# Patient Record
Sex: MALE | Race: WHITE
[De-identification: names, ages, dates, MRNs, and addresses within clinical notes are randomized per-mention and may not be internally consistent; named-entity substitution may affect disease eponyms.]

---

## 2021-12-04 ENCOUNTER — HOSPITAL ENCOUNTER (OUTPATIENT)
Dept: HOSPITAL 33 - ED | Age: 26
Setting detail: OBSERVATION
LOS: 1 days | Discharge: HOME | End: 2021-12-05
Attending: INTERNAL MEDICINE | Admitting: INTERNAL MEDICINE
Payer: SELF-PAY

## 2021-12-04 DIAGNOSIS — F10.929: Primary | ICD-10-CM

## 2021-12-04 DIAGNOSIS — Z20.828: ICD-10-CM

## 2021-12-04 DIAGNOSIS — R53.1: ICD-10-CM

## 2021-12-04 DIAGNOSIS — F17.200: ICD-10-CM

## 2021-12-04 DIAGNOSIS — S00.83XA: ICD-10-CM

## 2021-12-04 LAB
ALBUMIN SERPL-MCNC: 4.6 G/DL (ref 3.5–5)
ALP SERPL-CCNC: 65 U/L (ref 38–126)
ALT SERPL-CCNC: 14 U/L (ref 0–50)
ANION GAP SERPL CALC-SCNC: 18.5 MEQ/L (ref 5–15)
APAP SPEC-MCNC: < 10 UG/ML (ref 10–30)
AST SERPL QL: 24 U/L (ref 17–59)
BASOPHILS # BLD AUTO: 0.04 10*3/UL (ref 0–0.4)
BASOPHILS NFR BLD AUTO: 0.6 % (ref 0–0.4)
BILIRUB BLD-MCNC: 0.3 MG/DL (ref 0.2–1.3)
BUN SERPL-MCNC: 16 MG/DL (ref 9–20)
CALCIUM SPEC-MCNC: 8.5 MG/DL (ref 8.4–10.2)
CHLORIDE SERPL-SCNC: 106 MMOL/L (ref 98–107)
CO2 SERPL-SCNC: 21 MMOL/L (ref 22–30)
CREAT SERPL-MCNC: 0.98 MG/DL (ref 0.66–1.25)
EOSINOPHIL # BLD AUTO: 0.07 10*3/UL (ref 0–0.5)
ETHANOL SERPL-MCNC: 295 MG/DL (ref 0–10)
GFR SERPLBLD BASED ON 1.73 SQ M-ARVRAT: > 60 ML/MIN
GLUCOSE SERPL-MCNC: 103 MG/DL (ref 74–106)
HCT VFR BLD AUTO: 46.5 % (ref 42–50)
HGB BLD-MCNC: 15.9 GM/DL (ref 12.5–18)
LYMPHOCYTES # SPEC AUTO: 1.31 10*3/UL (ref 1–4.6)
MCH RBC QN AUTO: 31.6 PG (ref 26–32)
MCHC RBC AUTO-ENTMCNC: 34.2 G/DL (ref 32–36)
MONOCYTES # BLD AUTO: 0.41 10*3/UL (ref 0–1.3)
PLATELET # BLD AUTO: 235 K/MM3 (ref 150–450)
POTASSIUM SERPLBLD-SCNC: 4.4 MMOL/L (ref 3.5–5.1)
PROT SERPL-MCNC: 7.4 G/DL (ref 6.3–8.2)
RBC # BLD AUTO: 5.03 M/MM3 (ref 4.1–5.6)
SALICYLATES SERPL-MCNC: < 1 MG/DL (ref 2–20)
SODIUM SERPL-SCNC: 141 MMOL/L (ref 137–145)
WBC # BLD AUTO: 7.2 K/MM3 (ref 4–10.5)

## 2021-12-04 PROCEDURE — 36415 COLL VENOUS BLD VENIPUNCTURE: CPT

## 2021-12-04 PROCEDURE — 72125 CT NECK SPINE W/O DYE: CPT

## 2021-12-04 PROCEDURE — 0241U: CPT

## 2021-12-04 PROCEDURE — 85025 COMPLETE CBC W/AUTO DIFF WBC: CPT

## 2021-12-04 PROCEDURE — 70450 CT HEAD/BRAIN W/O DYE: CPT

## 2021-12-04 PROCEDURE — 99285 EMERGENCY DEPT VISIT HI MDM: CPT

## 2021-12-04 PROCEDURE — 93005 ELECTROCARDIOGRAM TRACING: CPT

## 2021-12-04 PROCEDURE — G0378 HOSPITAL OBSERVATION PER HR: HCPCS

## 2021-12-04 PROCEDURE — 80053 COMPREHEN METABOLIC PANEL: CPT

## 2021-12-04 PROCEDURE — 80307 DRUG TEST PRSMV CHEM ANLYZR: CPT

## 2021-12-04 PROCEDURE — G0480 DRUG TEST DEF 1-7 CLASSES: HCPCS

## 2021-12-04 NOTE — ERPHSYRPT
- History of Present Illness


Time Seen by Provider: 12/04/21 20:36


Source: patient, police


Exam Limitations: intoxication


Patient Subjective Stated Complaint: Alcohol intoxication


Triage Nursing Assessment: Patient escorted into ED via 2 police. Patient alert 

to self. Patient unable to stand alone without assistance. Police state they 

were called to a residence where the patient's parent's live due to a 

disturbance. Patient's parent's report to police that he is homeless and lives 

[place] and they picked him up to shower and get something to eat. 

Patient then became intoxicated and fighting with family. Upon police arrival 

patient unable to stand erect per self.


Physician History: 





26-year-old is brought in the ER by PD who were called by parents as patient was

intoxicated and was fighting with them.  Patient is having difficulty/hard time 

standing and is sleepy, arousable and alert to self.  Has a small abrasion/cut 

on the right forehead.  Patient is placed in ED hold by PD.  No further history 

available.


Allergies/Adverse Reactions: 








No Known Drug Allergies Allergy (Verified 12/04/21 20:35)


   





Home Medications: 








No Reportable Medications [No Reported Medications]  07/07/13 [History]





Hx Tetanus, Diphtheria Vaccination/Date Given: Yes


Hx Influenza Vaccination/Date Given: No


Hx Pneumococcal Vaccination/Date Given: No


Immunizations Up to Date: Yes





Travel Risk





- International Travel


Have you traveled outside of the country in past 3 weeks: No





- Coronavirus Screening


Are you exhibiting any of the following symptoms?: No


Close contact with a COVID-19 positive Pt in past 14-21 Days: No





- Vaccine Status


Have you recieved a Covid-19 vaccination: No


: Unknown





- Vaccination Dates


Dates if Unknown: unknown





- Past Medical History


Pertinent Past Medical History: Yes


GI Medical History: Other


Psycho-Social History: Depression


Other Medical History: Poor historian





- Past Surgical History


Past Surgical History: No


Other Surgical History: Poor historian





- Social History


Smoking Status: Never smoker


Exposure to second hand smoke: No


Drug Use: marijuana


Patient Lives Alone: Yes (homeless)





- Review of Systems


All Other Systems: Unable due to condition





- Nursing Vital Signs


Nursing Vital Signs: 


                               Initial Vital Signs











Temperature  96.8 F   12/04/21 20:35


 


Pulse Rate  87   12/04/21 20:35


 


Respiratory Rate  18   12/04/21 20:35


 


Blood Pressure  119/71   12/04/21 20:35


 


O2 Sat by Pulse Oximetry  95   12/04/21 20:35








                                   Pain Scale











Pain Intensity                 0

















- Physical Exam


General Appearance: no apparent distress, other (Sleepy but arousable)


Eyes, Ears, Nose, Throat Exam: normal ENT inspection, TMs normal, pharynx 

normal, moist mucous membranes


Neck Exam: normal inspection, non-tender, supple, full range of motion


Respiratory Exam: normal breath sounds, lungs clear


Cardiovascular Exam: regular rate/rhythm, normal heart sounds


Gastrointestinal/Abdominal Exam: soft, normal bowel sounds, No tenderness


Extremities Exam: normal inspection


Current Suicidality: denies suicide plan


Neurological Exam: calm, CNs II-XII nml as tested, No normal mood/affect, No 

oriented x 3


Appearance: impaired insight


Behavior/Eye Contact/Speech: intoxicated appearance


Skin Exam: normal color


**SpO2 Interpretation**: normal


SpO2: 97


O2 Delivery: Room Air


Ordered Tests: 


                               Active Orders 24 hr











 Category Date Time Status


 


 CERVICAL SPINE WO CONTRAST [CT] Stat Exams  12/04/21 21:01 Taken


 


 HEAD WITHOUT CONTRAST [CT] Stat Exams  12/04/21 21:01 Taken


 


 ACETAMINOPHEN Stat Lab  12/04/21 21:57 Completed


 


 CBC W DIFF Stat Lab  12/04/21 21:57 Completed


 


 CMP Stat Lab  12/04/21 21:57 Completed


 


 ETHYL ALCOHOL Stat Lab  12/04/21 21:57 Completed


 


 SALICYLATE Stat Lab  12/04/21 21:57 Completed


 


 UA W/RFX UR CULTURE Stat Lab  12/04/21 21:13 Ordered


 


 Urine Triage Profile Stat Lab  12/04/21 21:13 Ordered











Lab/Rad Data: 


                           Laboratory Result Diagrams





                                 12/04/21 21:57 





                                 12/04/21 21:57 





                               Laboratory Results











  12/04/21 12/04/21 Range/Units





  21:57 21:57 


 


WBC   7.2  (4.0-10.5)  K/mm3


 


RBC   5.03  (4.1-5.6)  M/mm3


 


Hgb   15.9  (12.5-18.0)  gm/dl


 


Hct   46.5  (42-50)  %


 


MCV   92.4  ()  fl


 


MCH   31.6  (26-32)  pg


 


MCHC   34.2  (32-36)  g/dl


 


RDW   12.3  (11.5-14.0)  %


 


Plt Count   235  (150-450)  K/mm3


 


MPV   10.6  (7.5-11.0)  fl


 


Gran %   74.5 H  (36.0-66.0)  %


 


Eos # (Auto)   0.07  (0-0.5)  


 


Absolute Lymphs (auto)   1.31  (1.0-4.6)  


 


Absolute Monos (auto)   0.41  (0.0-1.3)  


 


Lymphocytes %   18.2 L  (24.0-44.0)  %


 


Monocytes %   5.7  (0.0-12.0)  %


 


Eosinophils %   1.0  (0.00-5.0)  %


 


Basophils %   0.6  (0.0-0.4)  %


 


Absolute Granulocytes   5.36  (1.4-6.9)  


 


Basophils #   0.04  (0-0.4)  


 


Sodium  141   (137-145)  mmol/L


 


Potassium  4.4   (3.5-5.1)  mmol/L


 


Chloride  106   ()  mmol/L


 


Carbon Dioxide  21 L   (22-30)  mmol/L


 


Anion Gap  18.5 H   (5-15)  MEQ/L


 


BUN  16   (9-20)  mg/dL


 


Creatinine  0.98   (0.66-1.25)  mg/dL


 


Estimated GFR  > 60.0   ML/MIN


 


Glucose  103   ()  mg/dL


 


Calcium  8.5   (8.4-10.2)  mg/dL


 


Total Bilirubin  0.30   (0.2-1.3)  mg/dL


 


AST  24   (17-59)  U/L


 


ALT  14   (0-50)  U/L


 


Alkaline Phosphatase  65   ()  U/L


 


Serum Total Protein  7.4   (6.3-8.2)  g/dL


 


Albumin  4.6   (3.5-5.0)  g/dL


 


Salicylates  < 1.0 L   (2-20)  mg/dL


 


Acetaminophen  < 10 L   (10-30)  ug/ml


 


Ethyl Alcohol  295 H   (0-10)  mg/dL














- Progress


Progress: unchanged


Progress Note: 








Patient is sleepy but arousable, no obvious focal neuro deficit grossly.  Obtain

 CT head which is negative.  Has a blood alcohol of 295.  Cannot be medically 

cleared.  Discussed with , reviewed history, work-up and patient is 

admitted for observation and once medically cleared, behavioral health 

evaluation would be obtained.


Discussed with : Leonel


Will see patient in: hospital (observation)


Counseled pt/family regarding: lab results, diagnosis, need for follow-up, rad 

results





- Departure


Departure Disposition: Observation


Clinical Impression: 


 Forehead contusion





Alcohol intoxication


Qualifiers:


 Complication of substance-induced condition: with unspecified complication 

Qualified Code(s): F10.929 - Alcohol use, unspecified with intoxication, 

unspecified





Condition: Stable


Critical Care Time: No

## 2021-12-05 VITALS — SYSTOLIC BLOOD PRESSURE: 142 MMHG | DIASTOLIC BLOOD PRESSURE: 74 MMHG | OXYGEN SATURATION: 98 % | HEART RATE: 105 BPM

## 2021-12-05 LAB
ALBUMIN SERPL-MCNC: 4.5 G/DL (ref 3.5–5)
ALP SERPL-CCNC: 68 U/L (ref 38–126)
ALT SERPL-CCNC: 13 U/L (ref 0–50)
ANION GAP SERPL CALC-SCNC: 15.9 MEQ/L (ref 5–15)
AST SERPL QL: 19 U/L (ref 17–59)
BASOPHILS # BLD AUTO: 0.02 10*3/UL (ref 0–0.4)
BASOPHILS NFR BLD AUTO: 0.3 % (ref 0–0.4)
BILIRUB BLD-MCNC: 0.3 MG/DL (ref 0.2–1.3)
BUN SERPL-MCNC: 11 MG/DL (ref 9–20)
CALCIUM SPEC-MCNC: 8.4 MG/DL (ref 8.4–10.2)
CHLORIDE SERPL-SCNC: 106 MMOL/L (ref 98–107)
CO2 SERPL-SCNC: 23 MMOL/L (ref 22–30)
CREAT SERPL-MCNC: 0.98 MG/DL (ref 0.66–1.25)
EOSINOPHIL # BLD AUTO: 0.14 10*3/UL (ref 0–0.5)
FLUAV AG NPH QL IA: NEGATIVE
FLUBV AG NPH QL IA: NEGATIVE
GFR SERPLBLD BASED ON 1.73 SQ M-ARVRAT: > 60 ML/MIN
GLUCOSE SERPL-MCNC: 94 MG/DL (ref 74–106)
HCT VFR BLD AUTO: 46.9 % (ref 42–50)
HGB BLD-MCNC: 16.1 GM/DL (ref 12.5–18)
LYMPHOCYTES # SPEC AUTO: 2.16 10*3/UL (ref 1–4.6)
MCH RBC QN AUTO: 31.4 PG (ref 26–32)
MCHC RBC AUTO-ENTMCNC: 34.3 G/DL (ref 32–36)
MONOCYTES # BLD AUTO: 0.54 10*3/UL (ref 0–1.3)
PLATELET # BLD AUTO: 246 K/MM3 (ref 150–450)
POTASSIUM SERPLBLD-SCNC: 4.2 MMOL/L (ref 3.5–5.1)
PROT SERPL-MCNC: 6.9 G/DL (ref 6.3–8.2)
RBC # BLD AUTO: 5.12 M/MM3 (ref 4.1–5.6)
RSV AG SPEC QL IA: NEGATIVE
SARS-COV-2 AG RESP QL IA.RAPID: NEGATIVE
SODIUM SERPL-SCNC: 142 MMOL/L (ref 137–145)
WBC # BLD AUTO: 6 K/MM3 (ref 4–10.5)

## 2021-12-05 NOTE — PCM.HP
History of Present Illness





- Chief Complaint


Chief Complaint: Alcohol intoxication


History of Present Illness: 


 is a 26 year old male.who were called by parents as patient was 

intoxicated and was fighting with them.  Patient is having difficulty/hard time 

standing and is sleepy, arousable and alert to self.  Has a small abrasion/cut 

on the right forehead.  Patient is placed in ED hold by PD.  No further history 

available.








- Review of Systems


Constitutional: Lethargy, Weakness, No Fever, No Chills


Eyes: No Symptoms


Ears, Nose, & Throat: No Symptoms


Respiratory: No Cough, No Short Of Breath


Cardiac: No Chest Pain, No Edema, No Syncope


Abdominal/Gastrointestinal: No Abdominal Pain, No Nausea, No Vomiting, No Diarrh

ea


Genitourinary Symptoms: No Dysuria


Musculoskeletal: No Back Pain, No Neck Pain


Skin: No Rash


Neurological: Lethargy, No Dizziness, No Focal Weakness, No Sensory Changes


Psychological: No Symptoms


Endocrine: No Symptoms


Hematologic/Lymphatic: No Symptoms


Immunological/Allergic: No Symptoms





Medications & Allergies


Home Medications: 


                              Home Medication List





No Reportable Medications [No Reported Medications]  07/07/13 [History Confirmed

 03/11/14]








Allergies/Adverse Reactions: 


                                    Allergies











Allergy/AdvReac Type Severity Reaction Status Date / Time


 


No Known Drug Allergies Allergy   Verified 12/04/21 20:35














- Past Medical History


Past Medical History: Yes


GI Medical History: Other


Pyscho-Social History: Depression


Comment: Poor historian





- Past Surgical History


Past Surgical History: No


Other Surgical History: Poor historian





- Social History


Smoking Status: Current every day smoker


Exposure to second hand smoke: Yes


Alcohol: Occasionally


Drug Use: marijuana





- Physical Exam


Vital Signs: 


                               Vital Signs - 24 hr











  Temp Pulse Resp BP Pulse Ox


 


 12/05/21 04:00  98.2 F  90  18  123/77  96


 


 12/05/21 03:25   90  18   96


 


 12/05/21 02:32  98.2 F  96 H  20  123/77  98


 


 12/05/21 01:35   61  16  108/68  97


 


 12/05/21 00:22   75   102/71  96


 


 12/04/21 23:00   77   116/66  97


 


 12/04/21 22:52      97


 


 12/04/21 22:11   80  14  114/79  97


 


 12/04/21 20:35  96.8 F  87  18  119/71  95











General Appearance: mild distress, lethargy


Neurologic Exam: alert


Eye Exam: PERRL/EOMI


Ears, Nose, Throat Exam: normal ENT inspection


Neck Exam: normal inspection


Respiratory Exam: normal breath sounds


Cardiovascular Exam: regular rate/rhythm


Gastrointestinal/Abdomen Exam: soft


Back Exam: normal inspection


Extremity Exam: normal inspection


Skin Exam: normal color





Results





- Labs


Lab/Micro Results: 


                            Lab Results-Last 24 Hours











  12/04/21 12/04/21 12/04/21 Range/Units





  21:57 21:57 23:24 


 


WBC  7.2    (4.0-10.5)  K/mm3


 


RBC  5.03    (4.1-5.6)  M/mm3


 


Hgb  15.9    (12.5-18.0)  gm/dl


 


Hct  46.5    (42-50)  %


 


MCV  92.4    ()  fl


 


MCH  31.6    (26-32)  pg


 


MCHC  34.2    (32-36)  g/dl


 


RDW  12.3    (11.5-14.0)  %


 


Plt Count  235    (150-450)  K/mm3


 


MPV  10.6    (7.5-11.0)  fl


 


Gran %  74.5 H    (36.0-66.0)  %


 


Eos # (Auto)  0.07    (0-0.5)  


 


Absolute Lymphs (auto)  1.31    (1.0-4.6)  


 


Absolute Monos (auto)  0.41    (0.0-1.3)  


 


Lymphocytes %  18.2 L    (24.0-44.0)  %


 


Monocytes %  5.7    (0.0-12.0)  %


 


Eosinophils %  1.0    (0.00-5.0)  %


 


Basophils %  0.6    (0.0-0.4)  %


 


Absolute Granulocytes  5.36    (1.4-6.9)  


 


Basophils #  0.04    (0-0.4)  


 


Sodium   141   (137-145)  mmol/L


 


Potassium   4.4   (3.5-5.1)  mmol/L


 


Chloride   106   ()  mmol/L


 


Carbon Dioxide   21 L   (22-30)  mmol/L


 


Anion Gap   18.5 H   (5-15)  MEQ/L


 


BUN   16   (9-20)  mg/dL


 


Creatinine   0.98   (0.66-1.25)  mg/dL


 


Estimated GFR   > 60.0   ML/MIN


 


Glucose   103   ()  mg/dL


 


Calcium   8.5   (8.4-10.2)  mg/dL


 


Total Bilirubin   0.30   (0.2-1.3)  mg/dL


 


AST   24   (17-59)  U/L


 


ALT   14   (0-50)  U/L


 


Alkaline Phosphatase   65   ()  U/L


 


Serum Total Protein   7.4   (6.3-8.2)  g/dL


 


Albumin   4.6   (3.5-5.0)  g/dL


 


Salicylates   < 1.0 L   (2-20)  mg/dL


 


Acetaminophen   < 10 L   (10-30)  ug/ml


 


Ethyl Alcohol   295 H   (0-10)  mg/dL


 


Influenza Type A Ag    NEGATIVE  (NEGATIVE)  


 


Influenza Type B Ag    NEGATIVE  (NEGATIVE)  


 


RSV (PCR)    NEGATIVE  (Negative)  


 


SARS-CoV-2 (PCR)    NEGATIVE  (NEGATIVE)  














  12/05/21 12/05/21 12/05/21 Range/Units





  06:10 06:10 06:10 


 


WBC  6.0    (4.0-10.5)  K/mm3


 


RBC  5.12    (4.1-5.6)  M/mm3


 


Hgb  16.1    (12.5-18.0)  gm/dl


 


Hct  46.9    (42-50)  %


 


MCV  91.6    ()  fl


 


MCH  31.4    (26-32)  pg


 


MCHC  34.3    (32-36)  g/dl


 


RDW  12.3    (11.5-14.0)  %


 


Plt Count  246    (150-450)  K/mm3


 


MPV  10.8    (7.5-11.0)  fl


 


Gran %  52.4    (36.0-66.0)  %


 


Eos # (Auto)  0.14    (0-0.5)  


 


Absolute Lymphs (auto)  2.16    (1.0-4.6)  


 


Absolute Monos (auto)  0.54    (0.0-1.3)  


 


Lymphocytes %  36.0    (24.0-44.0)  %


 


Monocytes %  9.0    (0.0-12.0)  %


 


Eosinophils %  2.3    (0.00-5.0)  %


 


Basophils %  0.3    (0.0-0.4)  %


 


Absolute Granulocytes  3.14    (1.4-6.9)  


 


Basophils #  0.02    (0-0.4)  


 


Sodium   142   (137-145)  mmol/L


 


Potassium   4.2   (3.5-5.1)  mmol/L


 


Chloride   106   ()  mmol/L


 


Carbon Dioxide   23   (22-30)  mmol/L


 


Anion Gap   15.9 H   (5-15)  MEQ/L


 


BUN   11   (9-20)  mg/dL


 


Creatinine   0.98   (0.66-1.25)  mg/dL


 


Estimated GFR   > 60.0   ML/MIN


 


Glucose   94   ()  mg/dL


 


Calcium   8.4   (8.4-10.2)  mg/dL


 


Total Bilirubin   0.30   (0.2-1.3)  mg/dL


 


AST   19   (17-59)  U/L


 


ALT   13   (0-50)  U/L


 


Alkaline Phosphatase   68   ()  U/L


 


Serum Total Protein   6.9   (6.3-8.2)  g/dL


 


Albumin   4.5   (3.5-5.0)  g/dL


 


Salicylates     (2-20)  mg/dL


 


Acetaminophen     (10-30)  ug/ml


 


Ethyl Alcohol    180 H  (0-10)  mg/dL


 


Influenza Type A Ag     (NEGATIVE)  


 


Influenza Type B Ag     (NEGATIVE)  


 


RSV (PCR)     (Negative)  


 


SARS-CoV-2 (PCR)     (NEGATIVE)  














- Radiology Impressions


Radiology Exams & Impressions: 


                              Radiology Procedures











 Category Date Time Status


 


 CERVICAL SPINE WO CONTRAST [CT] Stat Exams  12/04/21 21:01 Completed


 


 HEAD WITHOUT CONTRAST [CT] Stat Exams  12/04/21 21:01 Completed














Assessment/Plan


(1) Alcohol intoxication


Current Visit: Yes   Status: Acute   


Qualifiers: 


   Complication of substance-induced condition: with unspecified complication   

Qualified Code(s): F10.929 - Alcohol use, unspecified with intoxication, 

unspecified   


Assessment & Plan: 


                                 Chief Complaint





Diagnosis                        Alcohol intoxication





                                    Allergies











Allergy/AdvReac Type Severity Reaction Status Date / Time


 


No Known Drug Allergies Allergy   Verified 12/04/21 20:35








                           Vital Signs (Last 24 hours)











  Temp Pulse Resp BP Pulse Ox


 


 12/05/21 04:00  98.2 F  90  18  123/77  96


 


 12/05/21 03:25   90  18   96


 


 12/05/21 02:32  98.2 F  96 H  20  123/77  98


 


 12/05/21 01:35   61  16  108/68  97


 


 12/05/21 00:22   75   102/71  96


 


 12/04/21 23:00   77   116/66  97


 


 12/04/21 22:52      97


 


 12/04/21 22:11   80  14  114/79  97


 


 12/04/21 20:35  96.8 F  87  18  119/71  95








                               Current Medications











Generic Name Dose Route Start Last Admin





  Trade Name Jamal  PRN Reason Stop Dose Admin


 


Acetaminophen  650 mg  12/05/21 02:21 





  Acetaminophen 325 Mg Tablet  PO  01/04/22 02:20 





  Q4H PRN PRN  





  PAIN AND/OR FEVER  














Discontinued Medications














Generic Name Dose Route Start Last Admin





  Trade Name Jamal  PRN Reason Stop Dose Admin


 


Albuterol/Ipratropium  3 ml  12/05/21 02:21 





  Ipratropium/Albuterol Sulfate 3 Ml Ampul.Neb  IH  01/04/22 02:20 





  Q4HPRN PRN  





  SHORTNESS OF BREATH/WHEEZING  








                         Intake & Output (Last 24 hours)











 12/02/21 12/03/21 12/04/21 12/05/21





 11:59 11:59 11:59 11:59


 


Weight    86.9 kg








                       Laboratory Results (Last 24 hours)











  12/05/21 12/05/21 12/05/21





  06:10 06:10 06:10


 


WBC    6.0


 


RBC    5.12


 


Hgb    16.1


 


Hct    46.9


 


MCV    91.6


 


MCH    31.4


 


MCHC    34.3


 


RDW    12.3


 


Plt Count    246


 


MPV    10.8


 


Gran %    52.4


 


Eos # (Auto)    0.14


 


Absolute Lymphs (auto)    2.16


 


Absolute Monos (auto)    0.54


 


Lymphocytes %    36.0


 


Monocytes %    9.0


 


Eosinophils %    2.3


 


Basophils %    0.3


 


Absolute Granulocytes    3.14


 


Basophils #    0.02


 


Sodium   142 


 


Potassium   4.2 


 


Chloride   106 


 


Carbon Dioxide   23 


 


Anion Gap   15.9 H 


 


BUN   11 


 


Creatinine   0.98 


 


Estimated GFR   > 60.0 


 


Glucose   94 


 


Calcium   8.4 


 


Total Bilirubin   0.30 


 


AST   19 


 


ALT   13 


 


Alkaline Phosphatase   68 


 


Serum Total Protein   6.9 


 


Albumin   4.5 


 


Salicylates   


 


Acetaminophen   


 


Ethyl Alcohol  180 H  


 


Influenza Type A Ag   


 


Influenza Type B Ag   


 


RSV (PCR)   


 


SARS-CoV-2 (PCR)   














  12/04/21 12/04/21 12/04/21





  23:24 21:57 21:57


 


WBC    7.2


 


RBC    5.03


 


Hgb    15.9


 


Hct    46.5


 


MCV    92.4


 


MCH    31.6


 


MCHC    34.2


 


RDW    12.3


 


Plt Count    235


 


MPV    10.6


 


Gran %    74.5 H


 


Eos # (Auto)    0.07


 


Absolute Lymphs (auto)    1.31


 


Absolute Monos (auto)    0.41


 


Lymphocytes %    18.2 L


 


Monocytes %    5.7


 


Eosinophils %    1.0


 


Basophils %    0.6


 


Absolute Granulocytes    5.36


 


Basophils #    0.04


 


Sodium   141 


 


Potassium   4.4 


 


Chloride   106 


 


Carbon Dioxide   21 L 


 


Anion Gap   18.5 H 


 


BUN   16 


 


Creatinine   0.98 


 


Estimated GFR   > 60.0 


 


Glucose   103 


 


Calcium   8.5 


 


Total Bilirubin   0.30 


 


AST   24 


 


ALT   14 


 


Alkaline Phosphatase   65 


 


Serum Total Protein   7.4 


 


Albumin   4.6 


 


Salicylates   < 1.0 L 


 


Acetaminophen   < 10 L 


 


Ethyl Alcohol   295 H 


 


Influenza Type A Ag  NEGATIVE  


 


Influenza Type B Ag  NEGATIVE  


 


RSV (PCR)  NEGATIVE  


 


SARS-CoV-2 (PCR)  NEGATIVE  








                             Orders (Last 24 hours)











 Category Date Time Status


 


 Bedrest ROUTINE Activity  12/05/21 02:21 Active


 


 Up With Assistance ROUTINE Activity  12/05/21 02:21 Active


 


 Code Status Order ROUTINE Care  12/05/21 02:21 Active


 


 Fall Protocol Q1H Care  12/05/21 02:21 Active


 


 IV Care Q6H Care  12/05/21 02:21 Active


 


 Neuro Checks Q1H Care  12/05/21 02:21 Active


 


 Place in Observation ROUTINE Care  12/05/21 02:21 Active


 


 Higinio Greer ROUTINE Care  12/05/21 02:21 Active


 


 CERVICAL SPINE WO CONTRAST [CT] Stat Exams  12/04/21 21:01 Completed


 


 HEAD WITHOUT CONTRAST [CT] Stat Exams  12/04/21 21:01 Completed


 


 ACETAMINOPHEN Stat Lab  12/04/21 21:57 Completed


 


 CBC W DIFF AM.LAB Lab  12/05/21 06:10 Completed


 


 CBC W DIFF Stat Lab  12/04/21 21:57 Completed


 


 CMP AM.LAB Lab  12/05/21 06:10 Completed


 


 CMP Stat Lab  12/04/21 21:57 Completed


 


 ETHYL ALCOHOL Stat Lab  12/04/21 21:57 Completed


 


 ETHYL ALCOHOL Stat Lab  12/05/21 06:10 Completed


 


 SALICYLATE Stat Lab  12/04/21 21:57 Completed


 


 UA W/RFX UR CULTURE Stat Lab  12/04/21 21:13 Ordered


 


 Urine Triage Profile Stat Lab  12/04/21 21:13 Ordered


 


 Acetaminophen 325 mg*** [Tylenol 325 mg***] Med  12/05/21 02:21 Active





 650 mg PO Q4H PRN PRN   


 


 Albuterol/Ipratropium 3ml Neb* [DUONEB 0.5-3 MG/3 ml Med  12/05/21 02:21 

Discontinued





 Neb**]   





 3 ml IH Q4HPRN PRN   


 


 Respiratory Therapy Assessment DAILY RT  12/05/21 03:24 Completed

















(2) Forehead contusion


Current Visit: Yes   Status: Acute   


Qualifiers: 


   Encounter type: initial encounter   Qualified Code(s): S00.83XA - Contusion 

of other part of head, initial encounter   


Code(s): S00.83XA - CONTUSION OF OTHER PART OF HEAD, INITIAL ENCOUNTER

## 2021-12-05 NOTE — XRAY
Indication: Trauma following fall.  Contusion.  Intoxication.



Multiple contiguous axial images obtained through the cervical spine.

Sagittal and coronal reformatted images obtained.



Comparison: None



Axial images negative for acute fracture, suspicious bony lesions, or spinal

canal stenosis.  Sagittal and coronal reformatted images demonstrates lordotic

straightening, positional versus paraspinal spasm.  Vertebral body

heights/disc spaces are maintained.  No acute compression fracture,

subluxation, or jumped facet.  Normal appearing craniocervical junction.



Visualized noncontrasted soft tissues including on apices are unremarkable.



Impression: Cervical lordotic straightening, positional versus paraspinal

spasm.  Remaining CT cervical spine is negative.



Comment: Preliminary interpretation made by [facility].  No critical discrepancy.

## 2021-12-05 NOTE — XRAY
Indication: Trauma following fall.  Contusion.  Intoxication.



Multiple contiguous axial images obtained through the head without contrast.



Comparison: None



Normal appearing brain parenchyma, ventricles, and bony calvarium.  Visualized

paranasal sinuses and mastoid air cells are clear.



Impression: Normal CT head without contrast exam.



Comment: Preliminary interpretation made by [facility].  No critical discrepancy.

## 2021-12-06 NOTE — PCM.DS
Discharge Summary


Date of Admission: 


12/05/21 02:18





Admitting Physician: 


GRAYSON CALVERT





Primary Care Provider: 


NO FAMILY DOCTOR








Allergies


Allergies





No Known Drug Allergies Allergy (Verified 12/04/21 20:35)


   











Hospital Summary





- Hospital Course


Hospital Course: 








                                 Chief Complaint





Diagnosis                        Alcohol intoxication





                                    Allergies











Allergy/AdvReac Type Severity Reaction Status Date / Time


 


No Known Drug Allergies Allergy   Verified 12/04/21 20:35








                               Current Medications














Discontinued Medications














Generic Name Dose Route Start Last Admin





  Trade Name Freq  PRN Reason Stop Dose Admin


 


Acetaminophen  650 mg  12/05/21 02:21 





  Acetaminophen 325 Mg Tablet  PO  01/04/22 02:20 





  Q4H PRN PRN  





  PAIN AND/OR FEVER  


 


Albuterol/Ipratropium  3 ml  12/05/21 02:21 





  Ipratropium/Albuterol Sulfate 3 Ml Ampul.Neb  IH  01/04/22 02:20 





  Q4HPRN PRN  





  SHORTNESS OF BREATH/WHEEZING  








                         Intake & Output (Last 24 hours)











 12/04/21 12/05/21 12/06/21 12/07/21





 11:59 11:59 11:59 11:59


 


Intake Total  420 800 


 


Balance  420 800 


 


Weight  86.9 kg  














- Vitals & Intake/Output


Vital Signs: 





                                   Vital Signs











Temperature  98.3 F   12/05/21 12:00


 


Pulse Rate  105 H  12/05/21 12:00


 


Respiratory Rate  18   12/05/21 16:00


 


Blood Pressure  142/74   12/05/21 12:00


 


O2 Sat by Pulse Oximetry  98   12/05/21 12:00











Intake & Output: 





                                 Intake & Output











 12/04/21 12/05/21 12/06/21 12/07/21





 11:59 11:59 11:59 11:59


 


Intake Total  420 800 


 


Balance  420 800 


 


Weight  86.9 kg  














- Lab


Result Diagrams: 


                                 12/05/21 06:10





                                 12/05/21 06:10





- Radiology Exams


Ordered Rad Exams-Entire Visit: 





                              Radiology Procedures











 Category Date Time Status


 


 CERVICAL SPINE WO CONTRAST [CT] Stat Exams  12/04/21 21:01 Completed


 


 HEAD WITHOUT CONTRAST [CT] Stat Exams  12/04/21 21:01 Completed














- Procedures and Test


Procedures and Tests throughout Hospitalization: 





                            Therapy Orders & Screens





12/05/21 03:24


Respiratory Therapy Assessment DAILY 


   Comment: 


   Diagnosis: Alcohol intoxication














Discharge Exam


General Appearance: no apparent distress, alert


Neurologic Exam: alert, oriented x 3, cooperative, normal mood/affect, nml 

cerebellar function, sensation nml, No motor deficits


Eye Exam: PERRL, EOMI, eyes nml inspection


Ears, Nose, Throat Exam: normal ENT inspection, pharynx normal, moist mucous 

membranes


Neck Exam: normal inspection, non-tender, supple, full range of motion


Respiratory Exam: normal breath sounds, lungs clear, No respiratory distress


Cardiovascular Exam: regular rate/rhythm, normal heart sounds


Gastrointestinal/Abdomen Exam: soft, No tenderness, No mass


Male Genitalia Exam: deferred


Rectal Exam: deferred


Back Exam: normal inspection, normal range of motion, No CVA tenderness, No 

vertebral tenderness


Extremity Exam: normal inspection, normal range of motion


Skin Exam: normal color, warm, dry





Final Diagnosis/Problem List





- Final Discharge Diagnosis/Problem


(1) Alcohol intoxication


Status: Resolved   





(2) Forehead contusion


Status: Resolved   Code(s): S00.83XA - CONTUSION OF OTHER PART OF HEAD, INITIAL 

ENCOUNTER   





- Discharge


Discharge Date: 12/05/21


Disposition: Home, Self-Care


Condition: Stable


Prescriptions: 


No Action


   No Reportable Medications [No Reported Medications] 


Instructions:  Effects of Alcohol on Your Health


Follow up with: 


DOCTOR,NO FAMILY [Primary Care Provider] -